# Patient Record
Sex: FEMALE | URBAN - METROPOLITAN AREA
[De-identification: names, ages, dates, MRNs, and addresses within clinical notes are randomized per-mention and may not be internally consistent; named-entity substitution may affect disease eponyms.]

---

## 2023-03-13 ENCOUNTER — NURSE TRIAGE (OUTPATIENT)
Dept: NEUROSURGERY | Facility: CLINIC | Age: 28
End: 2023-03-13

## 2023-03-13 NOTE — TELEPHONE ENCOUNTER
Received a call from Yehuda Zapata at the [facility] asking if any of our providers placed MRI compatible Spinal Cord Stimulator  I advised yes, we have and can place MRI conditional SCS  She would like to refer a patient to us for an evaluation to replace her current [employer] SCS originally placed in 2018 by Dr Marisela Eric (in [place]) with an a new system that would allow the patient to have an MRI  I provided Laila with our office information, she will be faxing over records and a referral    She will be calling the patient to let her know to expect a call from us  Laila has requested a return call to her at [phone] to let her know when the patient has been scheduled  I also reached out to Fransico from [address] to see if he is able to provide more information about the patients system      [address] [facility] # [id number]  Serial # [id number]

## 2023-03-15 ENCOUNTER — TELEPHONE (OUTPATIENT)
Dept: NEUROSURGERY | Facility: CLINIC | Age: 28
End: 2023-03-15

## 2023-03-15 NOTE — TELEPHONE ENCOUNTER
3/15/23 SEE REFERRAL  TY  Kris Baptiste routed conversation to You 2 days ago     Kris Baptiste 2 days ago     NB  Received a call from Guero at the [facility] asking if any of our providers placed MRI compatible Spinal Cord Stimulator  I advised yes, we have and can place MRI conditional SCS  She would like to refer a patient to us for an evaluation to replace her current [employer] SCS originally placed in 2018 by Dr Sam Johnston (in [place]) with an a new system that would allow the patient to have an MRI      I provided Laila with our office information, she will be faxing over records and a referral    She will be calling the patient to let her know to expect a call from us    Laila has requested a return call to her at [phone] to let her know when the patient has been scheduled        I also reached out to Rolando Núñez from [employer] to see if he is able to provide more information about the patients system   [address] [facility] # [id number]  Serial # [id number]               Note          Laila [phone]  Kris Baptiste 2 days ago     [facility] call